# Patient Record
Sex: FEMALE | Race: WHITE | NOT HISPANIC OR LATINO | Employment: UNEMPLOYED
[De-identification: names, ages, dates, MRNs, and addresses within clinical notes are randomized per-mention and may not be internally consistent; named-entity substitution may affect disease eponyms.]

---

## 2023-02-18 ENCOUNTER — TRANSCRIBE ORDERS (OUTPATIENT)
Dept: OTHER | Age: 43
End: 2023-02-18

## 2023-02-18 DIAGNOSIS — Z80.0 FAMILY HISTORY OF COLON CANCER: Primary | ICD-10-CM

## 2023-02-21 ENCOUNTER — TRANSCRIBE ORDERS (OUTPATIENT)
Dept: OTHER | Age: 43
End: 2023-02-21

## 2023-02-21 DIAGNOSIS — Z80.9 FAMILY HISTORY OF CANCER: Primary | ICD-10-CM

## 2023-05-23 ENCOUNTER — VIRTUAL VISIT (OUTPATIENT)
Dept: ONCOLOGY | Facility: CLINIC | Age: 43
End: 2023-05-23
Attending: FAMILY MEDICINE
Payer: COMMERCIAL

## 2023-05-23 DIAGNOSIS — Z80.41 FAMILY HISTORY OF MALIGNANT NEOPLASM OF OVARY: Primary | ICD-10-CM

## 2023-05-23 DIAGNOSIS — Z80.3 FAMILY HISTORY OF MALIGNANT NEOPLASM OF BREAST: ICD-10-CM

## 2023-05-23 PROCEDURE — 96040 HC GENETIC COUNSELING, EACH 30 MINUTES: CPT | Mod: TEL,95 | Performed by: GENETIC COUNSELOR, MS

## 2023-05-23 NOTE — PROGRESS NOTES
5/23/2023    Virtual Visit Details  Type of service:  Telephone Visit   Phone call duration: 66 minutes     Referring Provider: Bel Gomez MD    Presenting Information:   Today Mayte elected for a virtual genetic counseling visit through the Cancer Risk Management Program to discuss her family history of ovarian and breast cancer. We reviewed this history, cancer screening recommendations, and available genetic testing options.    Personal History:  Malena is a 42 year old female. She does not have any personal history of cancer.    She had her first menstrual period at age 12/13, her first child at age 24, and is likely premenopausal. Malena had a hysterectomy in her 30's to address endometriosis; her ovaries and one fallopian tube remains in place and she has had no ovarian cancer screening to date. She reports that she has never used hormone replacement therapy.      She had a mammogram in February/March 2023 to address a left-sided lump that identified two presumably benign cysts. Malena has not had a colonoscopy, but has one scheduled in November 2023 when she turns 43. She recently had a dermatologic exam and is planning to schedule those exams annually. She does not regularly do any other cancer screening at this time.    Family History: (Please see scanned pedigree for detailed family history information)    One maternal aunt is in her 60/70's and has not had a cancer, but her daughter is 51 and was diagnosed with breast cancer at age 46. She has likely not pursued genetic testing.    Mayte's maternal grandmother was diagnosed with ovarian cancer in her 50's and passed away in her 60's.    One paternal aunt is in her 60's and was diagnosed with breast cancer in her 50's.    One paternal aunt was diagnosed with two melanomas on her arm in her 40/50's, bladder cancer in her 60/70's, and passed away in her 60/70's. She also had a benign abdominal growth removed.    One paternal aunt is in her 70's and  has likely not had a cancer, but her son was diagnosed with bladder cancer in his 40/50's.    Mayte's paternal grandmother was diagnosed with breast cancer in her 40/50's, followed by a possible bone cancer in her shoulder (unknown if primary cancer or site of metastasis) and passed away in her 60's.    Her maternal ethnicity is Mongolian, Latvian, and Mexican. Her paternal ethnicity is Mong[place]ian and [place]ian. There is no known Ashkenazi Gnosticist ancestry on either side of her family.    Discussion:    We reviewed the features of sporadic, familial, and hereditary cancers. In looking at Malnea's family history, it is possible that a cancer susceptibility gene is present as she has relatives on both sides of the family diagnosed with related cancers in several generations; several of these relatives were also diagnosed under age 50 and/or with multiple primary cancers. That being said, multiple other relatives (including her parents and multiple aunts/uncles) have not been diagnosed with a cancer. This likely reduces, but does not eliminate, the chance for an inherited cancer syndrome in her family.    We discussed the natural history and genetics of hereditary breast and gynecologic cancers, including Hereditary Breast and Ovarian Cancer (HBOC) syndrome.     We reviewed that the most common cause of hereditary breast and ovarian cancer is HBOC syndrome, which is caused by mutations in the BRCA1 and BRCA2 genes. Individuals with HBOC syndrome are at increased risk for several different cancers, including breast, ovarian, male breast, prostate, melanoma, and pancreatic cancer.    We discussed that there are additional genes that could cause increased risk for the cancers in her family. As many of these genes present with overlapping features in a family and accurate cancer risk cannot always be established based upon the pedigree analysis alone, it would be reasonable for Malena to consider panel genetic testing to  analyze multiple genes at once.    Based on her family history, Malena meets current National Comprehensive Cancer Network (NCCN) criteria for genetic testing of high penetrance breast and/or ovarian cancer genes (i.e. BRCA1, BRCA2, BRIP1, CDH1, PALB2, PTEN, RAD51C, RAD51D, TP53, Ambrosio syndrome genes, etc.).      A detailed handout regarding these genes/syndromes and the information we discussed was provided to Malena at the end of our appointment today and can be found in the after visit summary. Topics included: inheritance pattern, cancer risks, cancer screening recommendations, and also risks, benefits and limitations of testing.    We discussed that genetic testing for cancer susceptibility genes is typically most informative, though, when it is first performed on a family member with a personal history of cancer or their closest relatives. Testing is available to Malena, but with limitations. If Malena pursues testing at this time and receives a negative result, this does not rule out the possibility of a hereditary cancer syndrome in her and/or her family. Despite these limitations and given that several of her relatives with cancer have passed away, Malena expressed interest in proceeding with testing herself.    We reviewed genetic testing options for hereditary breast, gynecologic, and related cancers: [facility] Common Hereditary Cancers Panel or [facility] Multi-Cancer Panel. Malena expressed interest in learning as much information as possible from the testing. She opted for the [facility] Multi-Cancer Panel.  The [facility] Multi-Cancer Panel analyzes 84 genes associated with increased risk for cancer: AIP, ALK, APC, GEE, AXIN2, BAP1, BARD1, BLM, BMPR1A, BRCA1, BRCA2, BRIP1, CASR,CDC73, CDH1, CDK4, CDKN1B, CDKN1C, CDKN2A, CEBPA, CHEK2, CTNNA1, DICER1,DIS3L2, EGFR, EPCAM, FH, FLCN, GATA2, GPC3, GREM1, HOXB13, HRAS, KIT, MAX,MEN1, MET, MITF, MLH1, MSH2, MSH3, MSH6, MUTYH, NBN, NF1, NF2, NTHL1,PALB2, PDGFRA, PHOX2B,  PMS2, POLD1, POLE, POT1, EXPXV2P, PTCH1, PTEN, RAD50,RAD51C, RAD51D, RB1, RECQL4, RET, RUNX1, SDHA, SDHAF2, SDHB, SDHC, SDHD,SMAD4, SMARCA4, SMARCB1, SMARCE1, STK11, SUFU, TERC, TERT, [id number], TP53,TSC1, TSC2, VHL, WRN, WT1.  This panel includes genes associated with hereditary cancers across multiple major organ systems, including: breast and gynecologic (breast, ovarian, uterine), gastrointestinal (colorectal, gastric, pancreatic), endocrine (thyroid, paraganglioma/pheochromocytoma, parathyroid, pituitary), genitourinary (renal/urinary tract, prostate), skin (melanoma, basal cell carcinoma), brain/nervous system, sarcoma, and hematologic (myelodysplastic syndrome/leukemia).  Individuals who are found to carry a mutation in one of these genes are at increased risk of developing certain cancers/tumors. Depending on the gene mutation found, identifying those at elevated risk may guide implementation of additional screening, surveillance, and other interventions.    Consent was obtained over the phone and Mayte elected to have a saliva collection kit shipped to her home. Once her saliva sample is collected, genetic testing using the Multi-Cancer Panel will be sent to [facility]. Of note, testing will begin with the BRCA1 and BRCA2 genes with reflex to the complete panel. Turn around time: 2-3 weeks after Estefania receives her blood sample.    Medical Management: For Malena, we reviewed that the information from genetic testing may determine:    additional cancer screening for which Malena may qualify (i.e. mammogram and breast MRI, more frequent colonoscopies, more frequent dermatologic exams, etc.),    options for risk reducing surgeries Malena could consider (i.e. bilateral mastectomy, surgery to remove her ovaries, etc.),      and targeted chemotherapies if she were to develop certain cancers in the future (i.e. immunotherapy for individuals with Ambrosio syndrome, PARP inhibitors, etc.).     These recommendations and  possible targeted chemotherapies will be discussed in detail once genetic testing is completed.     Plan:  1) Today Malena elected to proceed with testing of the BRCA1 and BRCA2 genes, with reflex to the [facility] Multi-Cancer Panel, through [facility]. A saliva collection kit will be shipped to her home.  2) The results should be available 2-3 weeks after Estefania receives her saliva sample.  3) Malena will be contacted to schedule a virtual visit to discuss the results.    Akanksha Ortega MS, [facility] [place]  Licensed, Certified Genetic Counselor  Office: [phone]  Pager: [phone]

## 2023-05-23 NOTE — LETTER
5/23/2023         RE: Malena Copeland  [address] [address]        Dear Colleague,    Thank you for referring your patient, Maelna Copeland, to the [facility]. Please see a copy of my visit note below.    5/23/2023    Virtual Visit Details  Type of service:  Telephone Visit   Phone call duration: 66 minutes     Referring Provider: Bel Gomez MD    Presenting Information:   Today Mayte elected for a virtual genetic counseling visit through the Cancer Risk Management Program to discuss her family history of ovarian and breast cancer. We reviewed this history, cancer screening recommendations, and available genetic testing options.    Personal History:  Malena is a 42 year old female. She does not have any personal history of cancer.    She had her first menstrual period at age 12/13, her first child at age 24, and is likely premenopausal. Malena had a hysterectomy in her 30's to address endometriosis; her ovaries and one fallopian tube remains in place and she has had no ovarian cancer screening to date. She reports that she has never used hormone replacement therapy.      She had a mammogram in February/March 2023 to address a left-sided lump that identified two presumably benign cysts. Malena has not had a colonoscopy, but has one scheduled in November 2023 when she turns 43. She recently had a dermatologic exam and is planning to schedule those exams annually. She does not regularly do any other cancer screening at this time.    Family History: (Please see scanned pedigree for detailed family history information)    One maternal aunt is in her 60/70's and has not had a cancer, but her daughter is 51 and was diagnosed with breast cancer at age 46. She has likely not pursued genetic testing.    Mayte's maternal grandmother was diagnosed with ovarian cancer in her 50's and passed away in her 60's.    One paternal aunt is in her 60's and was diagnosed with  breast cancer in her 50's.    One paternal aunt was diagnosed with two melanomas on her arm in her 40/50's, bladder cancer in her 60/70's, and passed away in her 60/70's. She also had a benign abdominal growth removed.    One paternal aunt is in her 70's and has likely not had a cancer, but her son was diagnosed with bladder cancer in his 40/50's.    Mayte's paternal grandmother was diagnosed with breast cancer in her 40/50's, followed by a possible bone cancer in her shoulder (unknown if primary cancer or site of metastasis) and passed away in her 60's.    Her maternal ethnicity is [place]er, Beninese, and N[place]uan. Her paternal ethnicity is [place]er and [place]inese. There is no known Ashkenazi Rastafarian ancestry on either side of her family.    Discussion:    We reviewed the features of sporadic, familial, and hereditary cancers. In looking at Malena's family history, it is possible that a cancer susceptibility gene is present as she has relatives on both sides of the family diagnosed with related cancers in several generations; several of these relatives were also diagnosed under age 50 and/or with multiple primary cancers. That being said, multiple other relatives (including her parents and multiple aunts/uncles) have not been diagnosed with a cancer. This likely reduces, but does not eliminate, the chance for an inherited cancer syndrome in her family.    We discussed the natural history and genetics of hereditary breast and gynecologic cancers, including Hereditary Breast and Ovarian Cancer (HBOC) syndrome.     We reviewed that the most common cause of hereditary breast and ovarian cancer is HBOC syndrome, which is caused by mutations in the BRCA1 and BRCA2 genes. Individuals with HBOC syndrome are at increased risk for several different cancers, including breast, ovarian, male breast, prostate, melanoma, and pancreatic cancer.    We discussed that there are additional genes that could cause increased risk for the  cancers in her family. As many of these genes present with overlapping features in a family and accurate cancer risk cannot always be established based upon the pedigree analysis alone, it would be reasonable for Malena to consider panel genetic testing to analyze multiple genes at once.    Based on her family history, Malena meets current National Comprehensive Cancer Network (NCCN) criteria for genetic testing of high penetrance breast and/or ovarian cancer genes (i.e. BRCA1, BRCA2, BRIP1, CDH1, PALB2, PTEN, RAD51C, RAD51D, TP53, Ambrosio syndrome genes, etc.).      A detailed handout regarding these genes/syndromes and the information we discussed was provided to Malena at the end of our appointment today and can be found in the after visit summary. Topics included: inheritance pattern, cancer risks, cancer screening recommendations, and also risks, benefits and limitations of testing.    We discussed that genetic testing for cancer susceptibility genes is typically most informative, though, when it is first performed on a family member with a personal history of cancer or their closest relatives. Testing is available to Malena, but with limitations. If Malena pursues testing at this time and receives a negative result, this does not rule out the possibility of a hereditary cancer syndrome in her and/or her family. Despite these limitations and given that several of her relatives with cancer have passed away, Malena expressed interest in proceeding with testing herself.    We reviewed genetic testing options for hereditary breast, gynecologic, and related cancers: [facility] Common Hereditary Cancers Panel or [facility] Multi-Cancer Panel. Malena expressed interest in learning as much information as possible from the testing. She opted for the [facility] Multi-Cancer Panel.  The [facility] Multi-Cancer Panel analyzes 84 genes associated with increased risk for cancer: AIP, ALK, APC, GEE, AXIN2, BAP1, BARD1, BLM, BMPR1A, BRCA1,  BRCA2, BRIP1, CASR,CDC73, CDH1, CDK4, CDKN1B, CDKN1C, CDKN2A, CEBPA, CHEK2, CTNNA1, DICER1,DIS3L2, EGFR, EPCAM, FH, FLCN, GATA2, GPC3, GREM1, HOXB13, HRAS, KIT, MAX,MEN1, MET, MITF, MLH1, MSH2, MSH3, MSH6, MUTYH, NBN, NF1, NF2, NTHL1,PALB2, PDGFRA, PHOX2B, PMS2, POLD1, POLE, POT1, VUUJC1I, PTCH1, PTEN, RAD50,RAD51C, RAD51D, RB1, RECQL4, RET, RUNX1, SDHA, SDHAF2, SDHB, SDHC, SDHD,SMAD4, SMARCA4, SMARCB1, SMARCE1, STK11, SUFU, TERC, TERT, [id number], TP53,TSC1, TSC2, VHL, WRN, WT1.  This panel includes genes associated with hereditary cancers across multiple major organ systems, including: breast and gynecologic (breast, ovarian, uterine), gastrointestinal (colorectal, gastric, pancreatic), endocrine (thyroid, paraganglioma/pheochromocytoma, parathyroid, pituitary), genitourinary (renal/urinary tract, prostate), skin (melanoma, basal cell carcinoma), brain/nervous system, sarcoma, and hematologic (myelodysplastic syndrome/leukemia).  Individuals who are found to carry a mutation in one of these genes are at increased risk of developing certain cancers/tumors. Depending on the gene mutation found, identifying those at elevated risk may guide implementation of additional screening, surveillance, and other interventions.    Consent was obtained over the phone and Mayte elected to have a saliva collection kit shipped to her home. Once her saliva sample is collected, genetic testing using the Multi-Cancer Panel will be sent to [facility]. Of note, testing will begin with the BRCA1 and BRCA2 genes with reflex to the complete panel. Turn around time: 2-3 weeks after Estefania receives her blood sample.    Medical Management: For Malena, we reviewed that the information from genetic testing may determine:    additional cancer screening for which Malena may qualify (i.e. mammogram and breast MRI, more frequent colonoscopies, more frequent dermatologic exams, etc.),    options for risk reducing surgeries Malena could consider (i.e.  bilateral mastectomy, surgery to remove her ovaries, etc.),      and targeted chemotherapies if she were to develop certain cancers in the future (i.e. immunotherapy for individuals with Ambrosio syndrome, PARP inhibitors, etc.).     These recommendations and possible targeted chemotherapies will be discussed in detail once genetic testing is completed.     Plan:  1) Today Malena elected to proceed with testing of the BRCA1 and BRCA2 genes, with reflex to the [facility] Multi-Cancer Panel, through [facility]. A saliva collection kit will be shipped to her home.  2) The results should be available 2-3 weeks after [facility] receives her saliva sample.  3) Malena will be contacted to schedule a virtual visit to discuss the results.    Akanksha Ortega MS, [facility] [place]  Licensed, Certified Genetic Counselor  Office: [phone]  Pager: [phone]    Again, thank you for allowing me to participate in the care of your patient.        Sincerely,        Akanksha Ortega, RENETTA

## 2023-05-23 NOTE — NURSING NOTE
Is the patient currently in the state of [place]? YES    Visit mode:TELEPHONE    If the visit is dropped, the patient can be reconnected by: TELEPHONE VISIT: Phone number: [phone]    Will anyone else be joining the visit? NO      How would you like to obtain your AVS? MyChart    Are changes needed to the allergy or medication list? NO    Reason for visit: Consult    Elisha JARQUIN

## 2023-05-24 NOTE — PATIENT INSTRUCTIONS
Assessing Cancer Risk  Cancer is a common diagnosis which impacts many families.  Individuals may develop cancer due to environmental factors (such as exposures and lifestyle), aging, genetic predisposition, or a combination of these factors.      Only about 5-10% of cancers are thought to be due to an inherited cancer susceptibility gene.    These families often have:  Several people with the same or related types of cancer  Cancers diagnosed at a young age (before age 50)  Individuals with more than one primary cancer  Multiple generations of the family affected with cancer    Comprehensive Breast and Gynecologic Cancer Panel  We each inherit two copies of every gene in our bodies: one from our mother, and one from our father. Each gene has a specific job to do.  When a gene has a mistake or  mutation  in it, it does not work like it should.     Some people may be candidates for genetic testing of more than one gene.  For these families, genetic testing using a cancer panel may be offered. These panels will test different genes at once known to increase the risk for breast, ovarian, uterine, and/or other cancers.    This handout will review common hereditary breast and gynecologic cancer syndromes. The genes that will be discussed in this handout are: GEE, BRCA1, BRCA2, BRIP1, CDH1, CHEK2, MLH1, MSH2, MSH6, PMS2, EPCAM, PTEN, PALB2, RAD51C, RAD51D, and TP53.    The purpose of this handout is to serve as a brief summary of the breast and gynecologic cancer risk genes that have published clinical management guidelines for individuals who are found to carry a mutation. Inheriting a mutation does not mean a person will develop cancer, but it does significantly increase their risk above the general population risk.     ______________________________________________________________________________    Hereditary Breast and Ovarian Cancer Syndrome (BRCA1 and BRCA2)  A single mutation in one of the copies of BRCA1 or  BRCA2 increases the risk for breast and ovarian cancer, among others.  The risk for pancreatic cancer and melanoma may also be slightly increased in some families.  The chart below shows the chance that someone with a BRCA mutation would develop cancer in his or her lifetime1,2,3,4.       Lifetime Cancer Risks    General Population BRCA1  BRCA2   Breast  12% >60% >60%   Ovarian  1-2% 39-58% 13-29%   Prostate 12% 7-26% 19-61%   Male Breast 0.1% 0.2-1.2% 1.8-7.1%   Pancreas 1-2% Up to 5% 5-10%     A person s ethnic background is also important to consider, as individuals of Ashkenazi Baptism ancestry have a higher chance of having a BRCA gene mutation.  There are three BRCA mutations that occur more frequently in this population.      Ambrosio Syndrome (MLH1, MSH2, MSH6, PMS2, and EPCAM)  Currently five genes are known to cause Ambrosio Syndrome: MLH1, MSH2, MSH6, PMS2, and EPCAM.  A single mutation in one of the Ambrosio Syndrome genes increases the risk for colon, endometrial, ovarian, and stomach cancers.  Other cancers that occur less commonly in Ambrosio Syndrome include urinary tract, skin, and brain cancers.  The chart below shows the chance that a person with Ambrosio syndrome would develop cancer in his or her lifetime5.      Lifetime Cancer Risks    General Population Ambrosio Syndrome   Colon 5% 10-61%   Endometrial 3% 13-57%   Ovarian 1-2% 1-38%   Stomach <1% 1-9%   *Cancer risk varies depending on Ambrosio syndrome gene found      Cowden Syndrome (PTEN)  Cowden syndrome is a hereditary condition that increases the risk for breast, thyroid, endometrial, colon, and kidney cancer.  Cowden syndrome is caused by a mutation in the PTEN gene.  A single mutation in one of the copies of PTEN causes Cowden syndrome and increases cancer risk.  The chart below shows the chance that someone with a PTEN mutation would develop cancer in their lifetime6,7.  Other benign features seen in some individuals with Cowden syndrome include benign  skin lesions (facial papules, keratoses, lipomas), learning disability, autism, thyroid nodules, colon polyps, and larger head size.     Lifetime Cancer Risks    General Population Cowden   Breast 12% 40-60%*   Thyroid 1% Up to 38%   Renal 1-2% Up to 35%   Endometrial 3% Up to 28%   Colon 5% Up to 9%   Melanoma 2-3% Up to 6%   *Emerging data suggests the risk for breast cancer could be greater than 60%               Li-Fraumeni Syndrome (TP53)  Li-Fraumeni Syndrome (LFS) is a cancer predisposition syndrome caused by a mutation in the TP53 gene. A single mutation in one of the copies of TP53 increases the risk for multiple cancers. Individuals with LFS are at an increased risk for developing cancer at a young age. The lifetime risk for development of a LFS-associated cancer is 50% by age 30 and 90% by age 60.   Core Cancers: Sarcomas, Breast, Brain, Lung, Leukemias/Lymphomas, Adrenocortical carcinomas  Other Cancers: Gastrointestinal, Thyroid, Skin, Genitourinary       Hereditary Diffuse Gastric Cancer (CDH1)  Currently, one gene is known to cause hereditary diffuse gastric cancer (HDGC): CDH1.  Individuals with HDGC are at increased risk for diffuse gastric cancer and lobular breast cancer. Of people diagnosed with HDGC, 30-50% have a mutation in the CDH1 gene.  This suggests there are likely other genes that may cause HDGC that have not been identified yet.      Lifetime Cancer Risks    General Population HDGC   Diffuse Gastric  <1% ~80%   Breast 12% 41-60%       Additional Genes    GEE  GEE is a moderate-risk breast cancer gene. Women who have a mutation in GEE can have between a 2-4 fold increased risk for breast cancer compared to the general population8. GEE mutations have also been associated with increased risk for pancreatic cancer between 5-10%9. Individuals who inherit two GEE mutations have a condition called ataxia-telangiectasia (AT).  This rare autosomal recessive condition affects the nervous system  and immune system, and is associated with progressive cerebellar ataxia beginning in childhood. Individuals with ataxia-telangiectasia often have a weakened immune system and have an increased risk for childhood cancers.    PALB2  Mutations in PALB2 have been shown to increase the risk of breast cancer up to 41-60% in some families; where individuals fall within this risk range is dependent upon family [id number]. PALB2 mutations have also been associated with increased risk for pancreatic cancer between 5-10%.  Individuals who inherit two PALB2 mutations--one from their mother and one from their father--have a condition called Fanconi Anemia.  This rare autosomal recessive condition is associated with short stature, developmental delay, bone marrow failure, and increased risk for childhood cancers.    CHEK2   CHEK2 is a moderate-risk breast cancer gene.  Women who have a mutation in CHEK2 have around a 2-4 fold increased risk for breast cancer compared to the general population, and this risk may be higher depending upon family history.11,12,13 The risk of colon cancer may be twice as high as the general population risk of colon cancer of 5%. Mutations in CHEK2 have also been shown to increase the risk of other cancers, including prostate, however these cancer risks are currently not well understood.    BRIP1, RAD51C and RAD51D  Mutations in RAD51C and RAD51D have been shown to increase the risk of ovarian cancer and breast cancer 14,. Mutations in BRIP1 have been shown to increase the risk of ovarian cancer and possibly female breast cancer 15 .       Lifetime Cancer Risk    General Population        BRIP1   RAD51C  RAD51D   Breast 12% Not well defined 20-40% 20-40%   Ovarian 1-2% 5-15% 10-15% 10-20%     ______________________________________________________________  Inheritance  All of the cancer syndromes reviewed above are inherited in an autosomal dominant pattern.  This means that if a parent has a mutation,  each of their children will have a 50% chance of inheriting that same mutation. Therefore, each child --male or female-- would have a 50% chance of being at increased risk for developing cancer.    Image obtained from Genetics Home Reference, 2013     Mutations in some genes can occur de evelyne, which means that a person s mutation occurred for the first time in them and was not inherited from a parent.  Now that they have the mutation, however, it can be passed on to future generations.    Genetic Testing  Genetic testing involves a blood test and will look for any harmful mutations that are associated with increased cancer risk.  If possible, it is recommended that the person(s) who has had cancer be tested before other family members.  That person will give us the most useful information about whether or not a specific gene is associated with the cancer in the family.    Results  There are three possible results of genetic testing:  Positive--a harmful mutation was identified in one or more of the genes  Negative--no mutations were identified in any of the genes tested  Variant of unknown significance--a variation in one of the genes was identified, but it is unclear how this impacts cancer risk in the family    Advantages and Disadvantages   There are advantages and disadvantages to genetic testing.    Advantages  May clarify your cancer risk  Can help you make medical decisions  May explain the cancers in your family  May give useful information to your family members (if you share your results)    Disadvantages  Possible negative emotional impact of learning about inherited cancer risk  Uncertainty in interpreting a negative test result in some situations  Possible genetic discrimination concerns (see below)    Genetic Information Nondiscrimination Act (MISAEL)  The Genetic Information Nondiscrimination Act of 2008 (MISAEL) is a federal law that protects individuals from health insurance or employment discrimination  based on a genetic test result alone (with some exceptions, including employers with fewer than 15 employees, and ).  Although rare, MISAEL  does not cover discrimination protections in terms of life insurance, long term care, or disability insurances.  Visit the National Human Repairy Research [place] website to learn more.    Reducing Cancer Risk  All of the genes described in this handout have nationally recognized cancer screening guidelines that would be recommended for individuals who test positive.  In addition to increased cancer screening, surgeries may be offered or recommended to reduce cancer risk.  Recommendations are based upon an individual s genetic test result as well as their personal and family history of cancer.    Questions to Think About Regarding Genetic Testing:  What effect will the test result have on me and my relationship with my family members if I have an inherited gene mutation?  If I don t have a gene mutation?  Should I share my test results, and how will my family react to this news, which may also affect them?  Are my children ready to learn new information that may one day affect their own health?    Hereditary Cancer Resources    FORCE: Facing Our Risk of Cancer Empowered facingourrisk.org   Bright Pink bebrightpink.org   Li-Fraumeni Syndrome Association lfsassociation.org   PTEN World PTENworld.com   No stomach for cancer, Inc. nostomachforcancer.org   Stomach cancer relief network Scrnet.org   Collaborative Group of the Americas on Inherited Colorectal Cancer (CGA) cgaicc.com    Cancer Care cancercare.org   American Cancer Society (ACS) cancer.org   National Cancer [place] (NCI) cancer.gov     Please call us if you have any questions or concerns.   Cancer Risk Management Program [phone]-CANCER ([phone])  Klever Olivas, MS [facility] [place]  [phone]  Naomi Bush, MS, [facility] [place] [phone]  Carline Prince, MS, [facility] [place]  [phone]  Digna Ryan, MS, [facility] [place]  [phone]  Lynette Lindsay,  MS, JD [facility] [place]  [phone]  Akanksha Ortega, MS, JD [facility] [place] [phone]  Linda Mack, MS, JD [facility] [place] [phone]    References  Garret Angel PDP, Jose Antnoio S, Karen CONTI, Mandy JE, Cinthia JL, Lou N, Leatha H, Noam O, Herminia A, Pasini B, Radilili P, Mansabrina S, Raj DM, Bustamante N, Yasmine E, Samuel H, Avila E, Stefani J, Gronjennifer J, Augustine B, Tulinius H, Thorlacius S, Eerola H, Nevanlinna H, Rodney K, Chaim OP. Average risks of breast and ovarian cancer associated with BRCA1 or BRCA2 mutations detected in case series unselected for family history: a combined analysis of 222 studies. Am J Hum Joseline. 2003;72:1117-30.  Cole N, Carmelina M, Aureliano G.  BRCA1 and BRCA2 Hereditary Breast and Ovarian Cancer. Gene Reviews online. 2013.  Lucas YC, Brittani S, Carmel G, Glover S. Breast cancer risk among male BRCA1 and BRCA2 mutation carriers. J Natl Cancer Inst. 2007;99:1811-4.  Oscar LING, Benjy I, Zain J, Cecil E, Bhumika ER, Becky F. Risk of breast cancer in male BRCA2 carriers. J Med Joseline. 2010;47:710-1.  National Comprehensive Cancer Network. Clinical practice guidelines in oncology, colorectal cancer screening. Available online (registration required). 2015.  Noah MH, Humberto J, Tammy J, Alyssa BRITTON, Mani MS, Eng C. Lifetime cancer risks in individuals with germline PTEN mutations. Clin Cancer Res. 2012;18:400-7.  Rahat R. Cowden Syndrome: A Critical Review of the Clinical Literature. J Joseline . 2009:18:13-27.  Abbie WING, Ebenezer LEMONS, Anjelica S, Elvia P, Dakota T, Viktoria M, Yang B, Skye H, Paul R, Jim K, Esther L, Oscar LING, Raj LEMONS, Chuckie DF, Deloris MR, The Breast Cancer Susceptibility Collaboration (UK) & Garrett RIOJAS. GEE mutations that cause ataxia-telangiectasia are breast cancer susceptibility alleles. Nature Genetics. 2006;38:873-875  Miguel N , Shavon Y, Angie J, Migel L, Dom WEEMS , Guy ML, Fiorella S, Rodri AG, Sindhu S, Iveth ML, Ligia J , Nichol R, Meng EASLEY, Nathaly  JR, Bartolo VE, Souleymane M, Voleighastein B, Heladio N, Marleny RH, Laisha KW, and Aly AP. GEE mutations in patients with hereditary pancreatic cancer. Cancer Discover. 2012;2:41-46  Arya ORO., et al. Breast-Cancer Risk in Families with Mutations in PALB2. NEJM. 2014; 371(6):497-506.  CHEK2 Breast Cancer Case-Control Consortium. CHEK2*1100delC and susceptibility to breast cancer: A collaborative analysis involving 10,860 breast cancer cases and 9,065 controls from 10 studies. Am J Hum Joseline, 74 (2004), pp. 6417-0555  Camryn T, Edgar S, Dwight K, et al. Spectrum of Mutations in BRCA1, BRCA2, CHEK2, and TP53 in Families at High Risk of Breast Cancer. MEHREEN. 2006;295(12):9000-8002.   Alie C, Martha D, Karen WING, et al. Risk of breast cancer in women with a CHEK2 mutation with and without a family history of breast cancer. J Clin Oncol. 2011;29:4583-1523.  Song H, Sharads E, Ramus SJ, et al. Contribution of germline mutations in the RAD51B, RAD51C, and RAD51D genes to ovarian cancer in the population. J Clin Oncol. 2015;33(26):2772-3326. Doi:10.1200/JCO.2015.61.2408.  Hanane T, Linda DF, Yudith P, et al. Mutations in BRIP1 confer high risk of ovarian cancer. Richa Joseline. 2011;43(11):8773-3219. doi:10.1038/ng.955.

## 2023-06-21 ENCOUNTER — VIRTUAL VISIT (OUTPATIENT)
Dept: ONCOLOGY | Facility: CLINIC | Age: 43
End: 2023-06-21
Attending: GENETIC COUNSELOR, MS
Payer: COMMERCIAL

## 2023-06-21 DIAGNOSIS — Z80.3 FAMILY HISTORY OF MALIGNANT NEOPLASM OF BREAST: ICD-10-CM

## 2023-06-21 DIAGNOSIS — Z80.41 FAMILY HISTORY OF MALIGNANT NEOPLASM OF OVARY: Primary | ICD-10-CM

## 2023-06-21 PROCEDURE — 96040 HC GENETIC COUNSELING, EACH 30 MINUTES: CPT | Mod: TEL,95 | Performed by: GENETIC COUNSELOR, MS

## 2023-06-21 NOTE — NURSING NOTE
Is the patient currently in the state of [place]? YES    Visit mode:TELEPHONE    If the visit is dropped, the patient can be reconnected by: TELEPHONE VISIT: Phone number:   Telephone Information:   Mobile [phone]       Will anyone else be joining the visit? No  (If patient encounters technical issues they should call [phone])    How would you like to obtain your AVS? MyChart    Are changes needed to the allergy or medication list? N/A    Reason for visit: MICHA Leon

## 2023-06-21 NOTE — LETTER
6/21/2023         RE: Malena Copeland  [address] [address]        Dear Colleague,    Thank you for referring your patient, Malena Copeland, to the [facility]. Please see a copy of my visit note below.    6/21/2023    Virtual Visit Details  Type of service:  Telephone Visit   Phone call duration: 32 minutes     Referring Provider: Bel Gomez MD    Presenting Information:  I spoke to Malena by phone today to discuss her genetic testing results. We last met on 5/23/2023 and her saliva sample was collected on 5/27/2023. The [facility] Multi-Cancer Panel was ordered from [facility]. This testing was done because of Malena's family history of cancer.    Genetic Testing Result: Variant of Uncertain Significance (VUS)  Malena was found to have a variant of uncertain significance (VUS) in the BRCA2 gene. This variant is called [id number] ([id number]). No other variants or mutations were found in the BRCA2 gene. Given the uncertain significance of this result, medical management decisions should NOT be made based on this test result alone.    Of note, Malena tested negative for variants and mutations in the following genes by sequencing and deletion/duplication analysis: AIP, ALK, APC, GEE, AXIN2, BAP1, BARD1, BLM, BMPR1A, BRCA1, BRIP1, CASR,CDC73, CDH1, CDK4, CDKN1B, CDKN1C, CDKN2A, CEBPA, CHEK2, CTNNA1, DICER1,DIS3L2, EGFR, EPCAM, FH, FLCN, GATA2, GPC3, GREM1, HOXB13, HRAS, KIT, MAX,MEN1, MET, MITF, MLH1, MSH2, MSH3, MSH6, MUTYH, NBN, NF1, NF2, NTHL1,PALB2, PDGFRA, PHOX2B, PMS2, POLD1, POLE, POT1, ONMJM3Y, PTCH1, PTEN, RAD50,RAD51C, RAD51D, RB1, RECQL4, RET, RUNX1, SDHA, SDHAF2, SDHB, SDHC, SDHD,SMAD4, SMARCA4, SMARCB1, SMARCE1, STK11, SUFU, TERC, TERT, KDEK523, TP53,TSC1, TSC2, VHL, WRN, WT1.  We reviewed the autosomal dominant inheritance of these genes.   Malena cannot pass on a mutation in any of these genes to her children based on this test result.  "Mutations in these genes do not skip generations.      A copy of the test report can be found in the Laboratory tab, dated 5/27/2023, and named \"LABORATORY MISCELLANEOUS ORDER\". The report is scanned in as a linked document.    Interpretation:  We discussed several different interpretations of this inconclusive test result. It is not clear if this variant in the BRCA2 gene is associated with increased cancer risk.  1. This variant may be a benign change that does not increase cancer risk.  2. This variant may be a harmful mutation that causes autosomal dominant Hereditary Breast and Ovarian Cancer syndrome and/or autosomal recessive Fanconi anemia.    Genetic testing labs are working to collect evidence to determine if this variant is harmful or benign, and [facility] will contact me if it is reclassified. If this variant is determined to be a benign change, there may be a different gene or combination of genes and environment that are associated with the cancers in this family that are not identifiable using this test. As such, Mayte is encouraged to contact me regularly to review any new genetic testing options that may be appropriate for her.    It is also important to consider that her relatives with cancer may have had a mutation in one of the genes tested and Mayte did not inherit it.    Inheritance:  We reviewed the autosomal dominant inheritance of this variant in the BRCA2 gene. We discussed that Malena has a 50% chance to pass this variant to each of her children. Likewise, her brother has a 50% risk of having the same variant. Because it is unclear what, if any, risk is associated with this variant, clinical genetic testing for this BRCA2 variant alone is not recommended for relatives.    Screening:  Based on this inconclusive test result, it is important for Malena and her relatives to refer back to the family history for appropriate cancer screening.    Based on her personal and family history, Malena has " a 24.2% lifetime risk of developing breast cancer based on the CHLOE 8 model. As such, Malena meets current National Comprehensive Cancer Network (NCCN) guidelines for high risk breast screening. This includes annual breast MRI in addition to annual mammogram beginning at her current age. In addition, Malena should be receiving clinical breast exams by her physician. We discussed that Malena could participate in our Cancer Risk Management Program in which our nursing specialist provides an individual screening plan and assists with medical management. Mayte declined a referral at this time, as she would prefer to discuss this screening with her primary care provider first. I encouraged her to contact me if she is interested in a referral in the future.  Malena s other close female relatives also remain at increased risk for breast cancer given their family history. Breast screening options should be discussed with an individual's primary care provider and a genetic counselor, to determine at what age to begin screening, what screening is appropriate, and if additional screening (such as breast MRI) is necessary based on personal/family history factors.  Due to Malena's family history of ovarian cancer, Malena and her other close maternal female relatives remain at slightly increased risk for ovarian cancer. We discussed available ovarian cancer screening (pelvic exams, CA-125 blood tests, and transvaginal ultrasounds) as well as the significant limitations of this screening. As such, this screening is not typically recommended. That being said, women in this family should discuss this screening and the signs and symptoms of ovarian cancer with their primary OB/GYN provider, as they may have individualized recommendations.  Other population cancer screening options, such as those recommended by the American Cancer Society and NCCN, are also appropriate for Malena and her family. These screening recommendations may  change if there are changes to Malena's personal and/or family history of cancer. Final screening recommendations should be made by each individual's primary care provider.      Additional Testing Considerations:  Although Malena's genetic testing result is inconclusive, other relatives may still carry a harmful gene mutation associated with hereditary cancer. As such, genetic counseling is recommended for her maternal first cousin with breast cancer, paternal aunt with breast cancer, and the children of her paternal aunt with melanoma to discuss their genetic testing options. Mayte's brother, parents, and other extended relatives on both sides of her family could also consider meeting with a genetic counselor. If any of these relatives do pursue genetic testing, Malena is encouraged to contact me so that we may review the impact of their test results on her.    Summary:  We do not have an explanation for Malena's family history of cancer. While no obviously harmful genetic changes were identified, Malena may still be at risk for certain cancers due to family history, environmental factors, or other genetic causes not identified by this test. Because of that, it is important that she continue with cancer screening based on her personal and family history as discussed above.    Genetic testing is rapidly advancing, and new cancer susceptibility genes will most likely be identified in the future. Therefore, I encouraged Malena to contact me periodically or if there are changes in her personal or family history. This may change how we assess her cancer risk, screening, and the testing we would offer.    Plan:  1. At her request, I provided Malena with a copy of her test results via [facility] patient portal.    2. She plans to follow-up with her medical providers, as needed.  3. She should contact me regularly, or sooner if her family history changes.  4. I will attempt to contact Malena if the laboratory informs me  that this BRCA2 variant has been reclassified. This may change screening and testing recommendations for Malena and her relatives.    If Malena has any further questions, I encouraged her to contact me at [phone].    Akanksha Ortega MS, [facility]  Licensed, Certified Genetic Counselor  Office: [phone]  Pager: [phone]

## 2023-06-21 NOTE — PROGRESS NOTES
"6/21/2023    Virtual Visit Details  Type of service:  Telephone Visit   Phone call duration: 32 minutes     Referring Provider: Bel Gomez MD    Presenting Information:  I spoke to Malena by phone today to discuss her genetic testing results. We last met on 5/23/2023 and her saliva sample was collected on 5/27/2023. The [facility] Multi-Cancer Panel was ordered from [email or web address]. This testing was done because of Malena's family history of cancer.    Genetic Testing Result: Variant of Uncertain Significance (VUS)  Malena was found to have a variant of uncertain significance (VUS) in the BRCA2 gene. This variant is called [id number] ([id number]). No other variants or mutations were found in the BRCA2 gene. Given the uncertain significance of this result, medical management decisions should NOT be made based on this test result alone.    Of note, Malena tested negative for variants and mutations in the following genes by sequencing and deletion/duplication analysis: AIP, ALK, APC, GEE, AXIN2, BAP1, BARD1, BLM, BMPR1A, BRCA1, BRIP1, CASR,CDC73, CDH1, CDK4, CDKN1B, CDKN1C, CDKN2A, CEBPA, CHEK2, CTNNA1, DICER1,DIS3L2, EGFR, EPCAM, FH, FLCN, GATA2, GPC3, GREM1, HOXB13, HRAS, KIT, MAX,MEN1, MET, MITF, MLH1, MSH2, MSH3, MSH6, MUTYH, NBN, NF1, NF2, NTHL1,PALB2, PDGFRA, PHOX2B, PMS2, POLD1, POLE, POT1, SIGNM3O, PTCH1, PTEN, RAD50,RAD51C, RAD51D, RB1, RECQL4, RET, RUNX1, SDHA, SDHAF2, SDHB, SDHC, SDHD,SMAD4, SMARCA4, SMARCB1, SMARCE1, STK11, SUFU, TERC, TERT, WBMZ875, TP53,TSC1, TSC2, VHL, WRN, WT1.    We reviewed the autosomal dominant inheritance of these genes.     Malena cannot pass on a mutation in any of these genes to her children based on this test result. Mutations in these genes do not skip generations.      A copy of the test report can be found in the Laboratory tab, dated 5/27/2023, and named \"LABORATORY MISCELLANEOUS ORDER\". The report is scanned in as a linked document.    Interpretation:  We " discussed several different interpretations of this inconclusive test result. It is not clear if this variant in the BRCA2 gene is associated with increased cancer risk.  1. This variant may be a benign change that does not increase cancer risk.  2. This variant may be a harmful mutation that causes autosomal dominant Hereditary Breast and Ovarian Cancer syndrome and/or autosomal recessive Fanconi anemia.    Genetic testing labs are working to collect evidence to determine if this variant is harmful or benign, and Estefania will contact me if it is reclassified. If this variant is determined to be a benign change, there may be a different gene or combination of genes and environment that are associated with the cancers in this family that are not identifiable using this test. As such, Mayte is encouraged to contact me regularly to review any new genetic testing options that may be appropriate for her.    It is also important to consider that her relatives with cancer may have had a mutation in one of the genes tested and Mayte did not inherit it.    Inheritance:  We reviewed the autosomal dominant inheritance of this variant in the BRCA2 gene. We discussed that Malena has a 50% chance to pass this variant to each of her children. Likewise, her brother has a 50% risk of having the same variant. Because it is unclear what, if any, risk is associated with this variant, clinical genetic testing for this BRCA2 variant alone is not recommended for relatives.    Screening:  Based on this inconclusive test result, it is important for Malena and her relatives to refer back to the family history for appropriate cancer screening.      Based on her personal and family history, Malena has a 24.2% lifetime risk of developing breast cancer based on the CHLOE 8 model. As such, Malena meets current National Comprehensive Cancer Network (NCCN) guidelines for high risk breast screening. This includes annual breast MRI in addition to  annual mammogram beginning at her current age. In addition, Malena should be receiving clinical breast exams by her physician. We discussed that Malena could participate in our Cancer Risk Management Program in which our nursing specialist provides an individual screening plan and assists with medical management. Mayte declined a referral at this time, as she would prefer to discuss this screening with her primary care provider first. I encouraged her to contact me if she is interested in a referral in the future.    Malena s other close female relatives also remain at increased risk for breast cancer given their family history. Breast screening options should be discussed with an individual's primary care provider and a genetic counselor, to determine at what age to begin screening, what screening is appropriate, and if additional screening (such as breast MRI) is necessary based on personal/family history factors.    Due to Malena's family history of ovarian cancer, Malena and her other close maternal female relatives remain at slightly increased risk for ovarian cancer. We discussed available ovarian cancer screening (pelvic exams, CA-125 blood tests, and transvaginal ultrasounds) as well as the significant limitations of this screening. As such, this screening is not typically recommended. That being said, women in this family should discuss this screening and the signs and symptoms of ovarian cancer with their primary OB/GYN provider, as they may have individualized recommendations.    Other population cancer screening options, such as those recommended by the American Cancer Society and NCCN, are also appropriate for Malena and her family. These screening recommendations may change if there are changes to Malena's personal and/or family history of cancer. Final screening recommendations should be made by each individual's primary care provider.      Additional Testing Considerations:  Although Malena's genetic  testing result is inconclusive, other relatives may still carry a harmful gene mutation associated with hereditary cancer. As such, genetic counseling is recommended for her maternal first cousin with breast cancer, paternal aunt with breast cancer, and the children of her paternal aunt with melanoma to discuss their genetic testing options. Mayte's brother, parents, and other extended relatives on both sides of her family could also consider meeting with a genetic counselor. If any of these relatives do pursue genetic testing, Malena is encouraged to contact me so that we may review the impact of their test results on her.    Summary:  We do not have an explanation for Malena's family history of cancer. While no obviously harmful genetic changes were identified, Malena may still be at risk for certain cancers due to family history, environmental factors, or other genetic causes not identified by this test. Because of that, it is important that she continue with cancer screening based on her personal and family history as discussed above.    Genetic testing is rapidly advancing, and new cancer susceptibility genes will most likely be identified in the future. Therefore, I encouraged Malena to contact me periodically or if there are changes in her personal or family history. This may change how we assess her cancer risk, screening, and the testing we would offer.    Plan:  1. At her request, I provided Malena with a copy of her test results via [facility] patient portal.    2. She plans to follow-up with her medical providers, as needed.  3. She should contact me regularly, or sooner if her family history changes.  4. I will attempt to contact Malena if the laboratory informs me that this BRCA2 variant has been reclassified. This may change screening and testing recommendations for Malena and her relatives.    If Malena has any further questions, I encouraged her to contact me at [phone].    Akanksha Ortega MS,  [place]  Licensed, Certified Genetic Counselor  Office: [phone]  Pager: [phone]

## 2023-06-29 NOTE — PATIENT INSTRUCTIONS
Genetic Testing  Genetic testing involved a blood or saliva test which looked at the genetic information in select genes for variants associated with cancer risk.  This testing may have included analysis of a single gene due to a known variant in the family, multiple genes most associated with the cancers in a family, or an expanded panel of genes related to many types of cancers.    Results  There are several possible genetic test results, including:   Positive--a harmful mutation (also known as a  pathogenic  or  likely pathogenic  variant) was identified in a gene associated with increased cancer risk.  These risks, as well as medical management options, depend on the specific genetic variant identified.    Negative--no variants were identified in the genes analyzed   Variant of unknown significance--a variant was identified in one or more genes, though it is currently unclear how this impacts cancer risk in the family.  Genetic testing labs are working to collect evidence about these uncertain variants and may provide updates in the future.    What is a Variant of Unknown Significance?  A variant of unknown significance (VUS) is a genetic change with unclear clinical significance.  Scientists currently do not know if this specific variant is associated with increased cancer risks,  or if it is a benign (harmless) change with no impact on health.       A variant may be of uncertain significance for several reasons.  It is possible that this specific variant has not been seen before by the laboratory, or only in a small number of families.  There is currently not enough information to know how this variant may impact your health.          Reclassification  Genetic testing laboratories and researchers are collecting evidence to determine the importance of variants of unknown significance.  Many variants of uncertain significance are later reclassified as benign findings, however some may be associated with  increased cancer risk.  Laboratories will often notify the genetic counselor/ordering provider when a patient s VUS has been reclassified.        Some families may be candidates for participation in the laboratory s variant research programs to help determine the importance of their VUS.  Participating in these programs does not guarantee that families will learn the significance of their VUS immediately.  It could be months or years before a VUS is reclassified.       Screening Recommendations  A combination of personal and family history factors may inform cancer risk and medical management recommendations.  Population cancer screening options, such as those recommended by the American Cancer Society and the National Comprehensive Cancer Network (NCCN) are appropriate for many families at average risk for cancer.  However, earlier and/or more frequent screening may be recommended based on personal factors (lifestyle, exposures, medications, screening results), family history of cancer, and sometimes genetic factors.  These cancer risk management options should be discussed in more detail with an individual's medical providers.      It is usually not recommended that other relatives have genetic testing for the VUS unless it is done as part of the laboratory s variant research program because an individual s test results should not influence their cancer screening until we determine the importance of the VUS.  Your genetic counselor can help you and your relatives understand the risks and benefits of all genetic testing and cancer screening options.    Please call us if you have any questions or concerns.   Cancer Risk Management Program (Appointments: [phone])  Klever Olivas, MS [facility] [place]  [phone]  Naomi Bush, MS, [facility] [place] [phone]  Carline Prince, MS, [facility] [place]  [phone]  Digna Ryan, MS, [facility] [place]  [phone]  Lynette Lindsay, MS, [facility] [place]  [phone]  Akanksha Ortega, MS, [facility] [place] [phone]  Linda Mack MS,  [facility] [place] [phone]

## 2023-12-31 ENCOUNTER — HEALTH MAINTENANCE LETTER (OUTPATIENT)
Age: 43
End: 2023-12-31

## 2024-03-10 ENCOUNTER — HEALTH MAINTENANCE LETTER (OUTPATIENT)
Age: 44
End: 2024-03-10

## 2025-03-16 ENCOUNTER — HEALTH MAINTENANCE LETTER (OUTPATIENT)
Age: 45
End: 2025-03-16